# Patient Record
Sex: MALE | Race: BLACK OR AFRICAN AMERICAN | Employment: FULL TIME | URBAN - METROPOLITAN AREA
[De-identification: names, ages, dates, MRNs, and addresses within clinical notes are randomized per-mention and may not be internally consistent; named-entity substitution may affect disease eponyms.]

---

## 2024-02-07 ENCOUNTER — APPOINTMENT (OUTPATIENT)
Dept: GENERAL RADIOLOGY | Age: 39
End: 2024-02-07
Payer: OTHER MISCELLANEOUS

## 2024-02-07 ENCOUNTER — HOSPITAL ENCOUNTER (EMERGENCY)
Age: 39
Discharge: HOME | End: 2024-02-07
Attending: EMERGENCY MEDICINE
Payer: OTHER MISCELLANEOUS

## 2024-02-07 VITALS
OXYGEN SATURATION: 93 % | WEIGHT: 265 LBS | HEART RATE: 112 BPM | TEMPERATURE: 98.4 F | RESPIRATION RATE: 18 BRPM | BODY MASS INDEX: 35.89 KG/M2 | DIASTOLIC BLOOD PRESSURE: 84 MMHG | HEIGHT: 72 IN | SYSTOLIC BLOOD PRESSURE: 142 MMHG

## 2024-02-07 DIAGNOSIS — S46.812A STRAIN OF LEFT TRAPEZIUS MUSCLE, INITIAL ENCOUNTER: ICD-10-CM

## 2024-02-07 DIAGNOSIS — R07.89 CHEST WALL PAIN: ICD-10-CM
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
## 2024-02-07 PROCEDURE — 6370000000 HC RX 637 (ALT 250 FOR IP): Performed by: EMERGENCY MEDICINE

## 2024-02-07 PROCEDURE — 73030 X-RAY EXAM OF SHOULDER: CPT

## 2024-02-07 PROCEDURE — 71046 X-RAY EXAM CHEST 2 VIEWS: CPT

## 2024-02-07 PROCEDURE — 99283 EMERGENCY DEPT VISIT LOW MDM: CPT

## 2024-02-07 RX ORDER — IBUPROFEN 600 MG/1
600 TABLET ORAL 4 TIMES DAILY PRN
Qty: 40 TABLET | Refills: 0 | Status: SHIPPED | OUTPATIENT
Start: 2024-02-07

## 2024-02-07 RX ORDER — LIDOCAINE 4 G/G
1 PATCH TOPICAL ONCE
Status: DISCONTINUED | OUTPATIENT
Start: 2024-02-07 | End: 2024-02-08 | Stop reason: HOSPADM

## 2024-02-07 RX ORDER — METHOCARBAMOL 750 MG/1
750 TABLET, FILM COATED ORAL 3 TIMES DAILY PRN
Qty: 30 TABLET | Refills: 0 | Status: SHIPPED | OUTPATIENT
Start: 2024-02-07 | End: 2024-02-17

## 2024-02-07 RX ORDER — METHOCARBAMOL 500 MG/1
500 TABLET, FILM COATED ORAL ONCE
Status: COMPLETED | OUTPATIENT
Start: 2024-02-07 | End: 2024-02-07

## 2024-02-07 RX ORDER — IBUPROFEN 400 MG/1
800 TABLET ORAL ONCE
Status: COMPLETED | OUTPATIENT
Start: 2024-02-07 | End: 2024-02-07

## 2024-02-07 RX ORDER — LIDOCAINE 4 G/G
1 PATCH TOPICAL DAILY
Qty: 15 PATCH | Refills: 0 | Status: SHIPPED | OUTPATIENT
Start: 2024-02-07 | End: 2024-03-08

## 2024-02-07 RX ADMIN — METHOCARBAMOL 500 MG: 500 TABLET ORAL at 22:11

## 2024-02-07 RX ADMIN — IBUPROFEN 800 MG: 400 TABLET, FILM COATED ORAL at 22:10

## 2024-02-07 ASSESSMENT — PAIN DESCRIPTION - LOCATION: LOCATION: SHOULDER;NECK;LEG

## 2024-02-07 ASSESSMENT — PAIN - FUNCTIONAL ASSESSMENT
PAIN_FUNCTIONAL_ASSESSMENT: NONE - DENIES PAIN
PAIN_FUNCTIONAL_ASSESSMENT: 0-10

## 2024-02-07 ASSESSMENT — PAIN SCALES - GENERAL: PAINLEVEL_OUTOF10: 8

## 2024-02-07 ASSESSMENT — LIFESTYLE VARIABLES
HOW OFTEN DO YOU HAVE A DRINK CONTAINING ALCOHOL: 2-3 TIMES A WEEK
HOW MANY STANDARD DRINKS CONTAINING ALCOHOL DO YOU HAVE ON A TYPICAL DAY: 1 OR 2

## 2024-02-08 NOTE — ED PROVIDER NOTES
of any tenderness palpation of the bony structures of the right knee patient does have a small abrasion overlying the anterior surface of the right knee otherwise no external signs of trauma noted on physical examination  Neurologic:  speech is clear and intact without dysarthria, gait is intact, GCS of 15                     Ramon Vásquez MD  02/07/24 [id number]

## 2024-04-24 ENCOUNTER — HOSPITAL ENCOUNTER (EMERGENCY)
Age: 39
Discharge: HOME | End: 2024-04-24
Attending: EMERGENCY MEDICINE
Payer: COMMERCIAL

## 2024-04-24 ENCOUNTER — APPOINTMENT (OUTPATIENT)
Dept: GENERAL RADIOLOGY | Age: 39
End: 2024-04-24
Payer: COMMERCIAL

## 2024-04-24 VITALS
TEMPERATURE: 98.5 F | SYSTOLIC BLOOD PRESSURE: 148 MMHG | HEART RATE: 85 BPM | DIASTOLIC BLOOD PRESSURE: 98 MMHG | RESPIRATION RATE: 17 BRPM | OXYGEN SATURATION: 100 %

## 2024-04-24 DIAGNOSIS — M25.532 BILATERAL WRIST PAIN: Primary | ICD-10-CM

## 2024-04-24 DIAGNOSIS — M25.531 BILATERAL WRIST PAIN: Primary | ICD-10-CM
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
## 2024-04-24 PROCEDURE — 73110 X-RAY EXAM OF WRIST: CPT

## 2024-04-24 PROCEDURE — 99283 EMERGENCY DEPT VISIT LOW MDM: CPT

## 2024-04-24 ASSESSMENT — PAIN SCALES - GENERAL: PAINLEVEL_OUTOF10: 5

## 2024-04-24 ASSESSMENT — PAIN - FUNCTIONAL ASSESSMENT: PAIN_FUNCTIONAL_ASSESSMENT: 0-10

## 2024-04-24 NOTE — DISCHARGE INSTRUCTIONS
You were seen in the emergency department for wrist pain after a fall.  Your x-rays did not show any broken bones, however there is a small bone in the wrist where you have pain on the right that sometimes does not show up on x-ray.  Please wear the splint we have provided at all times except to bathe and follow-up with an orthopedic doctor within 1 week for reexamination and possible repeat x-rays.    Referral information has been provided in this paperwork for you to call and make an appointment with an orthopedic surgeon    Call 911 or go to the nearest Emergency Department immediately for worsening symptoms, difficulty breathing, chest pain, lightheadedness, difficulty moving or talking, sensory loss, difficulty controlling your bowel or bladder function, altered level of consciousness, neck stiffness, uncontrollable nausea or vomiting, uncontrollable pain, inability to tolerate oral intake, fever, chills, severe bleeding, signs of infection (spreading redness, area feels very warm, swelling, pain, foul-smelling drainage), thoughts of harming yourself or others, or any other concerns.    If we have prescribed you any new medications, please take them as prescribed and read the drug package insert carefully.  Do not drink alcohol, drive, or operate machinery if you are taking narcotic pain medications.

## 2024-04-24 NOTE — ED PROVIDER NOTES
THE [facility]  EMERGENCY DEPARTMENT ENCOUNTER          ATTENDING PHYSICIAN NOTE       Date of evaluation: 4/24/2024    Chief Complaint     Wrist Injury      History of Present Illness     Latrell Marie is a 38 y.o. right-hand-dominant male presents emergency department today with bilateral wrist and hand injury.  Patient states yesterday he was going down stairs at his home when a squirrel that had somehow made it into his home surprised him and he fell backwards on the stairs.  He caught himself with his bilateral hands and wrists and has had pain since that time.  He denies injury elsewhere.  No head injury or loss of consciousness.  Not on anticoagulation    Physical Exam     INITIAL VITALS: BP: (!) 148/98, Temp: 98.5 °F (36.9 °C), Pulse: 85, Respirations: 17, SpO2: 100 %     Physical Exam    Nursing note and vitals reviewed.    General:  Adult male, alert and appropriately interactive. In no distress.  HENT: Normocephalic and atraumatic. External ears normal. Nose appears normal externally.  Eyes: Conjunctivae normal. No scleral icterus.  Neck: Neck supple. No tracheal deviation present.   CV: Normal rate. Regular rhythm.  2+ bilateral radial pulses  Pulm: Effort normal on RA.   Musculoskeletal: No bony tenderness of the humerus, elbow, or forearm bilaterally.  Mild left ulnar styloid tenderness without deformity, no scaphoid tenderness or bony tenderness throughout the hand or digits.  Mild right scaphoid/snuffbox tenderness without deformity, no other bony tenderness of the wrist, hand, or digits.  Neurological: Alert and appropriately interactive. Face symmetric, speech without dysarthria or obvious aphasia. Moving all extremities spontaneously.   Skin: Warm, dry. No rash. No diaphoresis or erythema.  Psychiatric: Calm and cooperative with appropriate mood and affect.       ASSESSMENT / PLAN  (MEDICAL DECISION MAKING)   Latrell Marie is a 38 y.o. male who presents emergency department today for bilateral

## 2024-05-07 ENCOUNTER — TELEPHONE (OUTPATIENT)
Dept: ORTHOPEDIC SURGERY | Age: 39
End: 2024-05-07

## 2024-05-07 ENCOUNTER — OFFICE VISIT (OUTPATIENT)
Dept: ORTHOPEDIC SURGERY | Age: 39
End: 2024-05-07
Payer: COMMERCIAL

## 2024-05-07 VITALS — BODY MASS INDEX: 35.21 KG/M2 | WEIGHT: 260 LBS | HEIGHT: 72 IN | RESPIRATION RATE: 16 BRPM

## 2024-05-07 DIAGNOSIS — S63.501A SPRAIN OF RIGHT WRIST, INITIAL ENCOUNTER: Primary | ICD-10-CM

## 2024-05-07 PROCEDURE — 99203 OFFICE O/P NEW LOW 30 MIN: CPT | Performed by: ORTHOPAEDIC SURGERY

## 2024-05-07 NOTE — PROGRESS NOTES
CHIEF COMPLAINT: Bilateral wrist pain    DATE OF INJURY: 4/23/24    History:   Mr. Marie 38 y.o. right handed male presents today for the first visit for evaluation of a bilateral wrist pain / injury.  He really only complains of right wrist pain now.  The patient was referred by [facility] ER. This is evaluated as a personal injury.   The pain began 2 weeks ago.  He rates pain at 6/10.  There was a history of injury.  A squirrel got inside his house and surprised him as he was going downstairs and he fell backwards onto outstretched wrists.  Pain is located along the dorsum of his wrist.  Symptoms are worse with writing, opening a jar, and wrist extension.  The patient has not taken NSAIDs. The patient has used ice, with relief.   He was placed in a thumb spica brace, which helps.  The patient's occupation is .      Past Medical History:   Diagnosis Date    Asthma        No past surgical history on file.    No current outpatient medications on file prior to visit.     No current facility-administered medications on file prior to visit.       No Known Allergies    Social History     Socioeconomic History    Marital status: Single     Spouse name: Not on file    Number of children: Not on file    Years of education: Not on file    Highest education level: Not on file   Occupational History    Not on file   Tobacco Use    Smoking status: Never    Smokeless tobacco: Never   Substance and Sexual Activity    Alcohol use: Yes    Drug use: Never    Sexual activity: Not on file   Other Topics Concern    Not on file   Social History Narrative    Not on file     Social Determinants of Health     Financial Resource Strain: Not on file   Food Insecurity: Not on file   Transportation Needs: Not on file   Physical Activity: Not on file   Stress: Not on file   Social Connections: Not on file   Intimate Partner Violence: Not on file   Housing Stability: Not on file       No family history on

## 2025-01-28 ENCOUNTER — APPOINTMENT (OUTPATIENT)
Dept: GENERAL RADIOLOGY | Age: 40
End: 2025-01-28
Payer: COMMERCIAL

## 2025-01-28 ENCOUNTER — HOSPITAL ENCOUNTER (EMERGENCY)
Age: 40
Discharge: HOME | End: 2025-01-28
Attending: STUDENT IN AN ORGANIZED HEALTH CARE EDUCATION/TRAINING PROGRAM
Payer: COMMERCIAL

## 2025-01-28 VITALS
DIASTOLIC BLOOD PRESSURE: 95 MMHG | WEIGHT: 279.3 LBS | HEIGHT: 72 IN | BODY MASS INDEX: 37.83 KG/M2 | SYSTOLIC BLOOD PRESSURE: 160 MMHG | RESPIRATION RATE: 15 BRPM | TEMPERATURE: 98.3 F | HEART RATE: 83 BPM | OXYGEN SATURATION: 94 %

## 2025-01-28 DIAGNOSIS — J45.901 MILD ASTHMA WITH EXACERBATION, UNSPECIFIED WHETHER PERSISTENT: Primary | ICD-10-CM

## 2025-01-28 DIAGNOSIS — B34.9 ACUTE VIRAL SYNDROME: ICD-10-CM

## 2025-01-28 LAB
FLUAV RNA RESP QL NAA+PROBE: NOT DETECTED
FLUBV RNA RESP QL NAA+PROBE: NOT DETECTED
SARS-COV-2 RNA RESP QL NAA+PROBE: NOT DETECTED

## 2025-01-28 PROCEDURE — 94761 N-INVAS EAR/PLS OXIMETRY MLT: CPT

## 2025-01-28 PROCEDURE — 94640 AIRWAY INHALATION TREATMENT: CPT

## 2025-01-28 PROCEDURE — 6370000000 HC RX 637 (ALT 250 FOR IP): Performed by: PHYSICIAN ASSISTANT

## 2025-01-28 PROCEDURE — 99284 EMERGENCY DEPT VISIT MOD MDM: CPT

## 2025-01-28 PROCEDURE — 71045 X-RAY EXAM CHEST 1 VIEW: CPT

## 2025-01-28 PROCEDURE — 6360000002 HC RX W HCPCS: Performed by: PHYSICIAN ASSISTANT

## 2025-01-28 PROCEDURE — 87636 SARSCOV2 & INF A&B AMP PRB: CPT

## 2025-01-28 RX ORDER — ALBUTEROL SULFATE 5 MG/ML
5 SOLUTION RESPIRATORY (INHALATION) 4 TIMES DAILY PRN
Qty: 20 ML | Refills: 0 | Status: SHIPPED | OUTPATIENT
Start: 2025-01-28 | End: 2025-02-02

## 2025-01-28 RX ORDER — PREDNISONE 20 MG/1
40 TABLET ORAL DAILY
Qty: 10 TABLET | Refills: 0 | Status: SHIPPED | OUTPATIENT
Start: 2025-01-28 | End: 2025-02-02

## 2025-01-28 RX ORDER — IPRATROPIUM BROMIDE AND ALBUTEROL SULFATE 2.5; .5 MG/3ML; MG/3ML
1 SOLUTION RESPIRATORY (INHALATION) ONCE
Status: COMPLETED | OUTPATIENT
Start: 2025-01-28 | End: 2025-01-28

## 2025-01-28 RX ORDER — ALBUTEROL SULFATE 0.83 MG/ML
5 SOLUTION RESPIRATORY (INHALATION) ONCE
Status: COMPLETED | OUTPATIENT
Start: 2025-01-28 | End: 2025-01-28

## 2025-01-28 RX ORDER — ALBUTEROL SULFATE 90 UG/1
2 INHALANT RESPIRATORY (INHALATION) 4 TIMES DAILY PRN
Qty: 18 G | Refills: 0 | Status: SHIPPED | OUTPATIENT
Start: 2025-01-28

## 2025-01-28 RX ORDER — PREDNISONE 20 MG/1
60 TABLET ORAL ONCE
Status: COMPLETED | OUTPATIENT
Start: 2025-01-28 | End: 2025-01-28

## 2025-01-28 RX ADMIN — PREDNISONE 60 MG: 20 TABLET ORAL at 09:57

## 2025-01-28 RX ADMIN — ALBUTEROL SULFATE 5 MG: 2.5 SOLUTION RESPIRATORY (INHALATION) at 10:12

## 2025-01-28 RX ADMIN — IPRATROPIUM BROMIDE AND ALBUTEROL SULFATE 1 DOSE: 2.5; .5 SOLUTION RESPIRATORY (INHALATION) at 10:12

## 2025-01-28 NOTE — DISCHARGE INSTRUCTIONS
You are seen in the emergency department for your shortness of breath.  It appears you have a virus.  Take medication as prescribed.  Follow-up as an outpatient with a primary care physician.  Return if your symptoms change or worsen.

## 2025-01-28 NOTE — ED PROVIDER NOTES
ED Attending Attestation Note     Date of evaluation: 1/28/2025    I have discussed the case with the SAADIA. I have personally performed a history, physical exam, and my own medical decision making. I have reviewed the note and agree with the findings and plan.     INITIAL VITALS: BP: (!) 160/95, Temp: 98.3 °F (36.8 °C), Pulse: 90, Respirations: 18, SpO2: 95 %   Physical Exam  Vitals reviewed.   Cardiovascular:      Rate and Rhythm: Normal rate.   Pulmonary:      Comments: Decreased air movement bilaterally on auscultation  Neurological:      Mental Status: He is alert.         MDM:  My assessment reveals a well-appearing 39-year-old male with past medical history of asthma presenting with shortness of breath overnight.  On examination, he is decreased air movement bilaterally on auscultation consistent with an asthma exacerbation.  His workup is altogether reassuring with negative x-ray and COVID and flu swabs.  He was treated symptomatically with breathing treatments and steroids with improvement.  He will be discharged with a 5-day burst of steroids as well as refills on his nebulizers.  Discharged with strict return precautions and PCP follow-up         Esa James MD  01/28/25 3342    
improved after breathing treatment and 1 dose of steroids.  Patient discharged on 40 mg of prednisone x 5 days and I did refill his nebulized albuterol and an albuterol inhaler.  I did refer him to a primary care physician.  He has agreed to return if his symptoms change or worsen.    Is this patient to be included in the SEP-1 core measure? No Exclusion criteria - the patient is NOT to be included for SEP-1 Core Measure due to: Infection is not suspected    Medical Decision Making  Amount and/or Complexity of Data Reviewed  External Data Reviewed: notes.  Radiology: ordered.    Risk  Prescription drug management.        This patient was also evaluated by the attending physician. All care plans were discussed and agreed upon.    Clinical Impression     1. Mild asthma with exacerbation, unspecified whether persistent    2. Acute viral syndrome        Disposition     PATIENT REFERRED TO:  [facility]  [address]  [place] [id number] [phone]    Call         DISCHARGE MEDICATIONS:  New Prescriptions    ALBUTEROL (PROVENTIL) (5 MG/ML) 0.5% NEBULIZER SOLUTION    Take 1 mL by nebulization 4 times daily as needed for Wheezing    ALBUTEROL SULFATE HFA (VENTOLIN HFA) 108 (90 BASE) MCG/ACT INHALER    Inhale 2 puffs into the lungs 4 times daily as needed for Wheezing    PREDNISONE (DELTASONE) 20 MG TABLET    Take 2 tablets by mouth daily for 5 days       DISPOSITION Decision To Discharge 01/28/2025 10:49:55 AM   DISPOSITION CONDITION Stable             Diagnostic Results and Other Data     RADIOLOGY:  XR CHEST PORTABLE   Final Result   No acute cardiopulmonary abnormality.      Electronically signed by Alejandro Sanches          LABS:   Results for orders placed or performed during the hospital encounter of 01/28/25   COVID-19 & Influenza Combo    Specimen: Nasopharyngeal Swab   Result Value Ref Range    SARS-CoV-2 RNA, RT PCR NOT DETECTED NOT DETECTED    Influenza A NOT DETECTED NOT DETECTED
